# Patient Record
Sex: MALE | Race: ASIAN | Employment: FULL TIME | URBAN - METROPOLITAN AREA
[De-identification: names, ages, dates, MRNs, and addresses within clinical notes are randomized per-mention and may not be internally consistent; named-entity substitution may affect disease eponyms.]

---

## 2017-04-25 ENCOUNTER — HOSPITAL ENCOUNTER (EMERGENCY)
Facility: HOSPITAL | Age: 42
Discharge: HOME | End: 2017-04-25
Attending: EMERGENCY MEDICINE
Payer: COMMERCIAL

## 2017-04-25 ENCOUNTER — CHARTING TRANS (OUTPATIENT)
Dept: OTHER | Age: 42
End: 2017-04-25

## 2017-04-25 ENCOUNTER — IMAGING SERVICES (OUTPATIENT)
Dept: OTHER | Age: 42
End: 2017-04-25

## 2017-04-25 VITALS
RESPIRATION RATE: 16 BRPM | OXYGEN SATURATION: 99 % | SYSTOLIC BLOOD PRESSURE: 127 MMHG | BODY MASS INDEX: 27 KG/M2 | DIASTOLIC BLOOD PRESSURE: 89 MMHG | WEIGHT: 171.94 LBS | TEMPERATURE: 98 F | HEART RATE: 77 BPM

## 2017-04-25 DIAGNOSIS — S62.102A WRIST FRACTURE, LEFT, CLOSED, INITIAL ENCOUNTER: Primary | ICD-10-CM

## 2017-04-25 PROCEDURE — 99284 EMERGENCY DEPT VISIT MOD MDM: CPT

## 2017-04-25 PROCEDURE — 29125 APPL SHORT ARM SPLINT STATIC: CPT

## 2017-04-25 PROCEDURE — 96374 THER/PROPH/DIAG INJ IV PUSH: CPT

## 2017-04-25 PROCEDURE — 96376 TX/PRO/DX INJ SAME DRUG ADON: CPT

## 2017-04-25 RX ORDER — HYDROMORPHONE HYDROCHLORIDE 1 MG/ML
1 INJECTION, SOLUTION INTRAMUSCULAR; INTRAVENOUS; SUBCUTANEOUS EVERY 30 MIN PRN
Status: DISCONTINUED | OUTPATIENT
Start: 2017-04-25 | End: 2017-04-26

## 2017-04-25 RX ORDER — IBUPROFEN 600 MG/1
600 TABLET ORAL ONCE
Status: COMPLETED | OUTPATIENT
Start: 2017-04-25 | End: 2017-04-25

## 2017-04-25 RX ORDER — OXYCODONE HYDROCHLORIDE AND ACETAMINOPHEN 5; 325 MG/1; MG/1
1-2 TABLET ORAL EVERY 4 HOURS PRN
Qty: 20 TABLET | Refills: 0 | Status: ON HOLD | OUTPATIENT
Start: 2017-04-25 | End: 2017-04-27

## 2017-04-26 PROBLEM — S52.502A CLOSED FRACTURE OF DISTAL END OF LEFT RADIUS, UNSPECIFIED FRACTURE MORPHOLOGY, INITIAL ENCOUNTER: Status: ACTIVE | Noted: 2017-04-26

## 2017-04-26 NOTE — ED NOTES
PT arrives tearful and holding his left wrist. Repositioned on the cart with pillow for support to left forearm, ice applied.

## 2017-04-26 NOTE — ED NOTES
PT is dozing on cart, no distress. vss and B/P decreased. Pt states his pain remains the same. Dr Raisa Yoon informed.

## 2017-04-26 NOTE — H&P
HPI: 42year old male with left distal radius fracture    Past Medical History   Diagnosis Date   • Kidney stones    • Kidney stones 2000   • Visual impairment      reading glasses     History reviewed. No pertinent family history.     Social History   Tiana Emerson lesions. No erythema. Psychiatric: Appropriate mood and affect. Plan: left distal radius ORIF  We discussed continued conservative, non-surgical management for this problem which I always think is reasonable in elective circumstances.   However, given t

## 2017-04-26 NOTE — BRIEF OP NOTE
Pre-Operative Diagnosis: LEFT DISTAL RADIUS FRACTURE     Post-Operative Diagnosis: Left distal radius fracture      Procedure Performed:   Procedure(s):  LEFT DISTAL RADIUS OPEN REDUCTION INTERNAL FIXATION     Surgeon(s) and Role:     Rudi Medrano

## 2017-04-26 NOTE — ED PROVIDER NOTES
Patient Seen in: [facility] Emergency Department    History   Patient presents with:  Upper Extremity Injury (musculoskeletal)    Stated Complaint: left wrist deformity    HPI    Patient is a 50-year-old male who was playing soccer around 6:30 PM an All other systems reviewed and negative except as noted above. PSFH elements reviewed from today and agreed except as otherwise stated in HPI.     Physical Exam       ED Triage Vitals   BP 04/25/17 2018 159/114 mmHg   Pulse 04/25/17 2018 86   Resp 04 diagnosis)    Disposition:  Discharge    Follow-up:  Charli Stevens, [address] [address] [phone]    In 2 days        Medications Prescribed:  Discharge Medication List as of 4/25/2017 10:03 PM    START taking thes

## 2017-04-27 ENCOUNTER — SURGERY (OUTPATIENT)
Age: 42
End: 2017-04-27

## 2017-04-27 ENCOUNTER — ANESTHESIA EVENT (OUTPATIENT)
Dept: SURGERY | Facility: HOSPITAL | Age: 42
DRG: 512 | End: 2017-04-27
Payer: COMMERCIAL

## 2017-04-27 ENCOUNTER — HOSPITAL ENCOUNTER (INPATIENT)
Facility: HOSPITAL | Age: 42
LOS: 1 days | Discharge: HOME | DRG: 512 | End: 2017-04-28
Attending: ORTHOPAEDIC SURGERY | Admitting: ORTHOPAEDIC SURGERY
Payer: COMMERCIAL

## 2017-04-27 ENCOUNTER — APPOINTMENT (OUTPATIENT)
Dept: GENERAL RADIOLOGY | Facility: HOSPITAL | Age: 42
DRG: 512 | End: 2017-04-27
Attending: ORTHOPAEDIC SURGERY
Payer: COMMERCIAL

## 2017-04-27 ENCOUNTER — ANESTHESIA (OUTPATIENT)
Dept: SURGERY | Facility: HOSPITAL | Age: 42
DRG: 512 | End: 2017-04-27
Payer: COMMERCIAL

## 2017-04-27 PROBLEM — S52.502A DISTAL RADIUS FRACTURE, LEFT: Status: ACTIVE | Noted: 2017-04-27

## 2017-04-27 PROBLEM — R52 PAIN: Status: ACTIVE | Noted: 2017-04-27

## 2017-04-27 PROCEDURE — 0PSJ04Z REPOSITION LEFT RADIUS WITH INTERNAL FIXATION DEVICE, OPEN APPROACH: ICD-10-PCS | Performed by: ORTHOPAEDIC SURGERY

## 2017-04-27 PROCEDURE — 76001 XR FLUOROSCOPE EXAM >1 HR EXTENSIVE (CPT=76001): CPT

## 2017-04-27 DEVICE — IMPLANTS FOR ORTHOPEDIC BONE FIXATION
Type: IMPLANTABLE DEVICE | Site: WRIST | Status: FUNCTIONAL
Brand: VARIABLE ANGLE LOCKING SCREW

## 2017-04-27 DEVICE — IMPLANTS FOR ORTHOPEDIC BONE FIXATION. ALSO CALLED A BONE SCREW.
Type: IMPLANTABLE DEVICE | Site: WRIST | Status: FUNCTIONAL
Brand: VARIABLE ANGLE NON-LOCKING SCREW

## 2017-04-27 DEVICE — IMPLANTS FOR ORTHOPEDIC BONE FIXATION
Type: IMPLANTABLE DEVICE | Site: WRIST | Status: FUNCTIONAL
Brand: DISTAL RADIUS PLATE, NARROW, LEFT, 3 HOLES

## 2017-04-27 RX ORDER — SULFAMETHOXAZOLE AND TRIMETHOPRIM 800; 160 MG/1; MG/1
1 TABLET ORAL
Status: DISCONTINUED | OUTPATIENT
Start: 2017-04-28 | End: 2017-04-28

## 2017-04-27 RX ORDER — ONDANSETRON 2 MG/ML
4 INJECTION INTRAMUSCULAR; INTRAVENOUS AS NEEDED
Status: DISCONTINUED | OUTPATIENT
Start: 2017-04-27 | End: 2017-04-27 | Stop reason: HOSPADM

## 2017-04-27 RX ORDER — SODIUM PHOSPHATE, DIBASIC AND SODIUM PHOSPHATE, MONOBASIC 7; 19 G/133ML; G/133ML
1 ENEMA RECTAL ONCE AS NEEDED
Status: ACTIVE | OUTPATIENT
Start: 2017-04-27 | End: 2017-04-27

## 2017-04-27 RX ORDER — DOCUSATE SODIUM 100 MG/1
100 CAPSULE, LIQUID FILLED ORAL 2 TIMES DAILY
Status: DISCONTINUED | OUTPATIENT
Start: 2017-04-27 | End: 2017-04-28

## 2017-04-27 RX ORDER — ONDANSETRON 2 MG/ML
4 INJECTION INTRAMUSCULAR; INTRAVENOUS EVERY 6 HOURS PRN
Status: DISCONTINUED | OUTPATIENT
Start: 2017-04-27 | End: 2017-04-28

## 2017-04-27 RX ORDER — OXYCODONE HCL 10 MG/1
10 TABLET, FILM COATED, EXTENDED RELEASE ORAL EVERY 12 HOURS
Status: DISCONTINUED | OUTPATIENT
Start: 2017-04-27 | End: 2017-04-28

## 2017-04-27 RX ORDER — HYDROMORPHONE HYDROCHLORIDE 1 MG/ML
0.5 INJECTION, SOLUTION INTRAMUSCULAR; INTRAVENOUS; SUBCUTANEOUS EVERY 2 HOUR PRN
Status: DISCONTINUED | OUTPATIENT
Start: 2017-04-27 | End: 2017-04-28

## 2017-04-27 RX ORDER — BUPIVACAINE HYDROCHLORIDE 5 MG/ML
INJECTION, SOLUTION EPIDURAL; INTRACAUDAL AS NEEDED
Status: DISCONTINUED | OUTPATIENT
Start: 2017-04-27 | End: 2017-04-27 | Stop reason: HOSPADM

## 2017-04-27 RX ORDER — HYDROCODONE BITARTRATE AND ACETAMINOPHEN 5; 325 MG/1; MG/1
2 TABLET ORAL AS NEEDED
Status: DISCONTINUED | OUTPATIENT
Start: 2017-04-27 | End: 2017-04-27 | Stop reason: HOSPADM

## 2017-04-27 RX ORDER — HYDROCODONE BITARTRATE AND ACETAMINOPHEN 10; 325 MG/1; MG/1
2 TABLET ORAL EVERY 6 HOURS PRN
Status: DISCONTINUED | OUTPATIENT
Start: 2017-04-27 | End: 2017-04-27

## 2017-04-27 RX ORDER — SODIUM CHLORIDE, SODIUM LACTATE, POTASSIUM CHLORIDE, CALCIUM CHLORIDE 600; 310; 30; 20 MG/100ML; MG/100ML; MG/100ML; MG/100ML
INJECTION, SOLUTION INTRAVENOUS CONTINUOUS
Status: DISCONTINUED | OUTPATIENT
Start: 2017-04-27 | End: 2017-04-28

## 2017-04-27 RX ORDER — ZOLPIDEM TARTRATE 5 MG/1
5 TABLET ORAL NIGHTLY PRN
Status: DISCONTINUED | OUTPATIENT
Start: 2017-04-27 | End: 2017-04-28

## 2017-04-27 RX ORDER — OXYCODONE HYDROCHLORIDE 5 MG/1
5 TABLET ORAL ONCE
Status: COMPLETED | OUTPATIENT
Start: 2017-04-27 | End: 2017-04-27

## 2017-04-27 RX ORDER — NALOXONE HYDROCHLORIDE 0.4 MG/ML
80 INJECTION, SOLUTION INTRAMUSCULAR; INTRAVENOUS; SUBCUTANEOUS AS NEEDED
Status: DISCONTINUED | OUTPATIENT
Start: 2017-04-27 | End: 2017-04-27 | Stop reason: HOSPADM

## 2017-04-27 RX ORDER — HYDROCODONE BITARTRATE AND ACETAMINOPHEN 10; 325 MG/1; MG/1
1 TABLET ORAL EVERY 6 HOURS PRN
Status: DISCONTINUED | OUTPATIENT
Start: 2017-04-27 | End: 2017-04-27

## 2017-04-27 RX ORDER — IBUPROFEN 200 MG
600 TABLET ORAL ONCE
Status: COMPLETED | OUTPATIENT
Start: 2017-04-27 | End: 2017-04-27

## 2017-04-27 RX ORDER — HYDROCODONE BITARTRATE AND ACETAMINOPHEN 5; 325 MG/1; MG/1
1 TABLET ORAL AS NEEDED
Status: DISCONTINUED | OUTPATIENT
Start: 2017-04-27 | End: 2017-04-27 | Stop reason: HOSPADM

## 2017-04-27 RX ORDER — OXYCODONE HYDROCHLORIDE AND ACETAMINOPHEN 5; 325 MG/1; MG/1
1 TABLET ORAL EVERY 4 HOURS PRN
Status: DISCONTINUED | OUTPATIENT
Start: 2017-04-27 | End: 2017-04-28

## 2017-04-27 RX ORDER — MEPERIDINE HYDROCHLORIDE 25 MG/ML
25 INJECTION INTRAMUSCULAR; INTRAVENOUS; SUBCUTANEOUS ONCE
Status: COMPLETED | OUTPATIENT
Start: 2017-04-27 | End: 2017-04-27

## 2017-04-27 RX ORDER — HYDROMORPHONE HYDROCHLORIDE 1 MG/ML
0.4 INJECTION, SOLUTION INTRAMUSCULAR; INTRAVENOUS; SUBCUTANEOUS EVERY 5 MIN PRN
Status: DISCONTINUED | OUTPATIENT
Start: 2017-04-27 | End: 2017-04-27 | Stop reason: HOSPADM

## 2017-04-27 RX ORDER — IBUPROFEN 600 MG/1
600 TABLET ORAL EVERY 6 HOURS PRN
Status: DISCONTINUED | OUTPATIENT
Start: 2017-04-27 | End: 2017-04-28

## 2017-04-27 RX ORDER — CYCLOBENZAPRINE HCL 10 MG
10 TABLET ORAL 3 TIMES DAILY PRN
Status: DISCONTINUED | OUTPATIENT
Start: 2017-04-27 | End: 2017-04-28

## 2017-04-27 RX ORDER — HYDRALAZINE HYDROCHLORIDE 20 MG/ML
5 INJECTION INTRAMUSCULAR; INTRAVENOUS ONCE
Status: COMPLETED | OUTPATIENT
Start: 2017-04-27 | End: 2017-04-27

## 2017-04-27 RX ORDER — HYDROMORPHONE HYDROCHLORIDE 1 MG/ML
1 INJECTION, SOLUTION INTRAMUSCULAR; INTRAVENOUS; SUBCUTANEOUS EVERY 2 HOUR PRN
Status: DISCONTINUED | OUTPATIENT
Start: 2017-04-27 | End: 2017-04-28

## 2017-04-27 RX ORDER — POLYETHYLENE GLYCOL 3350 17 G/17G
17 POWDER, FOR SOLUTION ORAL DAILY PRN
Status: DISCONTINUED | OUTPATIENT
Start: 2017-04-27 | End: 2017-04-28

## 2017-04-27 RX ORDER — HYDRALAZINE HYDROCHLORIDE 20 MG/ML
INJECTION INTRAMUSCULAR; INTRAVENOUS
Status: COMPLETED
Start: 2017-04-27 | End: 2017-04-27

## 2017-04-27 RX ORDER — BISACODYL 10 MG
10 SUPPOSITORY, RECTAL RECTAL
Status: DISCONTINUED | OUTPATIENT
Start: 2017-04-27 | End: 2017-04-28

## 2017-04-27 RX ORDER — HYDROMORPHONE HYDROCHLORIDE 1 MG/ML
INJECTION, SOLUTION INTRAMUSCULAR; INTRAVENOUS; SUBCUTANEOUS
Status: COMPLETED
Start: 2017-04-27 | End: 2017-04-27

## 2017-04-27 RX ORDER — CEFAZOLIN SODIUM 1 G/3ML
INJECTION, POWDER, FOR SOLUTION INTRAMUSCULAR; INTRAVENOUS
Status: DISCONTINUED | OUTPATIENT
Start: 2017-04-27 | End: 2017-04-27

## 2017-04-27 NOTE — OR NURSING
Per Dr. Yasmine Antonio, Dr. Goran Castillo will admit the patient to 23 hr observation for pain control, he states that Dr. Goran Castillo will put orders in computer. Patient made aware. Dr. Yasmine Antonio made aware pain still 9/10, orders received for Demerol IV.  Patient made awar

## 2017-04-27 NOTE — OR NURSING
Dr. Femi Curtis made aware has been 30 minutes since ibuprofen administration, still with 8/10 pain to left wrist. Made Dr. Femi Curtis aware that Dr. Xiong  mentioned placing a block, Dr. Femi Curtis will call back.

## 2017-04-27 NOTE — ANESTHESIA PREPROCEDURE EVALUATION
PRE-OP EVALUATION    Patient Name: Uzma Vera    Pre-op Diagnosis: LEFT DISTAL RADIUS FRACTURE    Procedure(s):  LEFT DISTAL RADIUS OPEN REDUCTION INTERNAL FIXATION     Surgeon(s) and Role:     Julee Wallace MD - Primary    Pre-op vitals reviewed. status: Former Smoker  For 15.00 Years     Types: Cigarettes    Quit date: 04/26/2014    Smokeless tobacco: Never Used    Comment: 3 cigs/day for 15 yrs    Alcohol Use: Yes    Comment: socially        Drug Use: No     Available pre-op labs reviewed.

## 2017-04-27 NOTE — PROGRESS NOTES
ANESTHESIOLOGY    Patient complains of Left wrist pain after Left radius ORIF. The patient is initially sleeping in the room with no apparent distress. He states the pain is similar to his pre-op pain.   He rates the pain as 8/10 despite receiving pain me

## 2017-04-27 NOTE — ANESTHESIA POSTPROCEDURE EVALUATION
[facility]    Haven Poe Patient Status:  Hospital Outpatient Surgery   Age/Gender 42year old male MRN [id number]   Location [address] [facility] Attending Delilah Corona MD   Hosp Day # 0 PCP Shannen Matias MD

## 2017-04-27 NOTE — OR NURSING
Dr. Ryan Womack made aware patient continues to have pain 8/10 to left wrist. No new orders, will monitor.

## 2017-04-28 VITALS
OXYGEN SATURATION: 98 % | RESPIRATION RATE: 20 BRPM | DIASTOLIC BLOOD PRESSURE: 86 MMHG | BODY MASS INDEX: 26.53 KG/M2 | SYSTOLIC BLOOD PRESSURE: 130 MMHG | HEART RATE: 65 BPM | TEMPERATURE: 100 F | HEIGHT: 67 IN | WEIGHT: 169 LBS

## 2017-04-28 PROCEDURE — 85025 COMPLETE CBC W/AUTO DIFF WBC: CPT | Performed by: HOSPITALIST

## 2017-04-28 PROCEDURE — 80048 BASIC METABOLIC PNL TOTAL CA: CPT | Performed by: HOSPITALIST

## 2017-04-28 RX ORDER — POTASSIUM CHLORIDE 20 MEQ/1
20 TABLET, EXTENDED RELEASE ORAL ONCE
Status: COMPLETED | OUTPATIENT
Start: 2017-04-28 | End: 2017-04-28

## 2017-04-28 RX ORDER — PSEUDOEPHEDRINE HCL 30 MG
100 TABLET ORAL 2 TIMES DAILY PRN
Qty: 20 CAPSULE | Refills: 0 | Status: SHIPPED | OUTPATIENT
Start: 2017-04-28 | End: 2017-08-28

## 2017-04-28 NOTE — PLAN OF CARE
Pt slept good during the shift with adequate relief from current pain regimen . Voids in the washroom . Improved movement on the left hand . Will continue to monitor.

## 2017-04-28 NOTE — OPERATIVE REPORT
[facility]    PATIENT'S NAME: Neil Tong   ATTENDING PHYSICIAN: Carol Che M.D. OPERATING PHYSICIAN: Carol Che M.D.    PATIENT ACCOUNT#:   [de-identified]    LOCATION:  PACU [address] [place] [address] PACU 2 EDWP 10  MEDICAL RECORD #:   [id number]       DATE OF BIRTH: first 2 in locking and nonlocking fashion, next 2 in locking fashion, and then the more proximal row of the distal segment. We placed the remainder of the 2 screws in.   To get a little more volar flexion we did restore radial head, radial inclination, and

## 2017-04-28 NOTE — PLAN OF CARE
PAIN - ADULT    • Verbalizes/displays adequate comfort level or patient's stated pain goal Progressing        Patient/Family Goals    • Patient/Family Short Term Goal Progressing        SAFETY ADULT - FALL    • Free from fall injury Progressing        SKIN

## 2017-04-28 NOTE — PAYOR COMM NOTE
Attending Physician: Rachna Cavazos MD    Review Type: ADMISSION   Reviewer: Katya Zamudio       Date: April 28, 2017 - 9:08 AM  Payor: [address] [place] [address]  Authorization Number: [id number]/A  Admit date: 4/27/2017  9:05 AM   Admitted from Emergency Dept 4/27/2017 1230 Given 5 mg Intravenous Patricia Grossman RN      HYDROmorphone HCl PF (DILAUDID) 1 MG/ML injection 0.4 mg     Date Action Dose Route User    4/27/2017 1230 Given 0.4 mg Intravenous Patricia Grossman RN    4/27/2017 1220 Given 0.4 mg Homero Heaton for the following:     Neutrophil Absolute Prelim 8.66 (*)     Neutrophil Absolute 8.66 (*)     Monocyte Absolute 0.92 (*)     All other components within normal limits   CBC WITH DIFFERENTIAL WITH PLATELET   BASIC METABOLIC PANEL (8)         PLEASE FAX AL

## 2017-04-28 NOTE — CONSULTS
[facility] hospitalist initial consult note      PCP;Aung Ochoa MD  Consulted at the request of Dr. Bola Friend  Reason for consult medical co-management  HPI 41 yo male  With hx of kidney stones presented with left radius fracture after an accidental 04/28/17  0353   BP: 128/75   Pulse: 64   Temp: 97.6 °F (36.4 °C)   Resp: 18     Gen: awake, alert, no respiratory distress  HEENT; mmm, anicteric, EOMI  Neck no JVD  Lymph; no tender cervical LAD  CV: RRR, nl S1/S2  Pulm: CTA b/l  Abd; soft, not tender, +

## 2017-06-12 PROBLEM — Z47.89 ORTHOPEDIC AFTERCARE: Status: ACTIVE | Noted: 2017-06-12

## 2017-09-25 PROBLEM — S52.502A CLOSED FRACTURE OF DISTAL END OF LEFT RADIUS, UNSPECIFIED FRACTURE MORPHOLOGY, INITIAL ENCOUNTER: Status: RESOLVED | Noted: 2017-04-26 | Resolved: 2017-09-25

## 2017-09-25 PROBLEM — R52 PAIN: Status: RESOLVED | Noted: 2017-04-27 | Resolved: 2017-09-25

## 2017-09-25 PROBLEM — S52.502A DISTAL RADIUS FRACTURE, LEFT: Status: RESOLVED | Noted: 2017-04-27 | Resolved: 2017-09-25

## 2017-09-25 PROCEDURE — 87641 MR-STAPH DNA AMP PROBE: CPT | Performed by: INTERNAL MEDICINE

## 2017-09-29 PROCEDURE — 82043 UR ALBUMIN QUANTITATIVE: CPT | Performed by: INTERNAL MEDICINE

## 2017-09-29 PROCEDURE — 82746 ASSAY OF FOLIC ACID SERUM: CPT | Performed by: INTERNAL MEDICINE

## 2017-09-29 PROCEDURE — 82570 ASSAY OF URINE CREATININE: CPT | Performed by: INTERNAL MEDICINE

## 2017-09-29 PROCEDURE — 82607 VITAMIN B-12: CPT | Performed by: INTERNAL MEDICINE

## 2017-11-06 PROCEDURE — 87641 MR-STAPH DNA AMP PROBE: CPT | Performed by: INTERNAL MEDICINE

## 2019-03-06 PROBLEM — M75.81 TENDINITIS OF RIGHT ROTATOR CUFF: Status: ACTIVE | Noted: 2019-03-06

## 2019-10-07 PROBLEM — T84.498A FAILED ORTHOPEDIC IMPLANT (HCC): Status: ACTIVE | Noted: 2019-10-07

## (undated) DEVICE — SURGICAL INSTRUMENT OR ACCESSORY FOR ORTHOPEDIC BONE FIXATION: Brand: 1.8MM DRILL BIT FOR 2.4MM SCREWS - DISTAL RADIUS

## (undated) DEVICE — GLOVE SURG SENSICARE SZ 6-1/2

## (undated) DEVICE — UNDYED BRAIDED (POLYGLACTIN 910), SYNTHETIC ABSORBABLE SUTURE: Brand: COATED VICRYL

## (undated) DEVICE — GOWN SURG AERO CHROME XXL

## (undated) DEVICE — STERILE POLYISOPRENE POWDER-FREE SURGICAL GLOVES: Brand: PROTEXIS

## (undated) DEVICE — 3M™ COBAN™ NL STERILE NON-LATEX SELF-ADHERENT WRAP, 2084S, 4 IN X 5 YD (10 CM X 4,5 M), 18 ROLLS/CASE: Brand: 3M™ COBAN™

## (undated) DEVICE — KENDALL SCD EXPRESS SLEEVES, KNEE LENGTH, MEDIUM: Brand: KENDALL SCD

## (undated) DEVICE — SURGICAL INSTRUMENT OR ACCESSORY FOR ORTHOPEDIC BONE FIXATION: Brand: DRILL BIT KIT, 3.5MM

## (undated) DEVICE — UPPER EXTREMITY CDS-LF: Brand: MEDLINE INDUSTRIES, INC.

## (undated) DEVICE — CONVERTORS STOCKINETTE: Brand: CONVERTORS

## (undated) DEVICE — SUTURE ETHILON 3-0 PS-1

## (undated) DEVICE — SURGICAL INSTRUMENT OR ACCESSORY FOR ORTHOPEDIC BONE FIXATION: Brand: DISTAL RADIUS K-WIRE KIT

## (undated) DEVICE — DRAPE C-ARM UNIVERSAL

## (undated) DEVICE — SOL  .9 1000ML BTL

## (undated) DEVICE — SPLINT PRECUT ORTHOGLASS 4X15

## (undated) DEVICE — ZIMMER® STERILE DISPOSABLE TOURNIQUET CUFF WITH PLC, DUAL PORT, SINGLE BLADDER, 18 IN. (46 CM)

## (undated) NOTE — ED AVS SNAPSHOT
[facility] Emergency Department    [place]  [address] [address]    Phone:  [phone]    Fax:  [phone]   MRN: [id number]    Department:  [facility] Emergency Department   Date of Visit:  4/25/20 IF THERE IS ANY CHANGE OR WORSENING OF YOUR CONDITION, CALL YOUR PRIMARY CARE PHYSICIAN AT ONCE OR RETURN IMMEDIATELY TO THE EMERGENCY DEPARTMENT.     If you have been prescribed any medication(s), please fill your prescription right away and begin taking t

## (undated) NOTE — IP AVS SNAPSHOT
[facility] [place] [address] [place], [address] ~ [phone]                Discharge Summary   4/27/2017    Haven Poe           Admission Information        Provider Department    4/27/2017 Santa Morrow MD  3sw-A         T [    ]    [    ]         Margie Macdonald taking these medications        Instructions Authorizing Provider    Morning Afternoon Evening As Needed    HYDROcodone-acetaminophen  MG Tabs   Commonly known as:  NORCO        Take 1-2 tablets by mouth every 4 to Strict hand elevation will help to minimize pain, improve healing, and decrease the risk of infection.   · Keep hand elevated on two pillows when sitting or lying  · Maintain hand above the level of your heart as much as possible  · Keep hand at shoulder le General Post Op Instructions:  1. Do some nice big deep breathing-  ·  This can prevent pneumonia  2. Ambulate:   · Get up and walk several times a day-not strenuous  · Do Foot pumps when you are sitting  · This can prevent blood clots  3.  Drink Plenty of Contact information:    [address] [place] [address] [phone]          Follow up with your dermatologist.      Immunization History as of 4/28/2017  Never Reviewed    No immunizations on file.       Recent Hematology Lab Result can help with your Affordable Care Act coverage, as well as all types of Medicaid plans. To get signed up and covered, please call [phone] and ask to get set up for an insurance coverage that is in-network with [facility] Most common side effects: Constipation, drowsiness, dizziness, urinary retention (inability to urinate)   What to report to your healthcare team: Difficulty urinating, dizziness, no bowel movement in 2+ days, unresolved pain           GI Medications     do

## (undated) NOTE — LETTER
[facility]  [address] 60 [address] [facility], [address] [place] [address]    Consent for Operation    Date: __________________    Time: _______________    1.  I authorize the performance upon Marvin Plascencia the following operation:    Procedure(s):  LEFT DISTAL RADIUS OPEN procedure has been videotaped, the surgeon will obtain the original videotape. The hospital will not be responsible for storage or maintenance of this tape.     6. For the purpose of advancing medical education, I consent to the admittance of observers to t STATEMENTS REQUIRING INSERTION OR COMPLETION WERE FILLED IN.     Signature of Patient:   ___________________________    When the patient is a minor or mentally incompetent to give consent:  Signature of person authorized to consent for patient: ____________ drugs/illegal medications). Failure to inform my anesthesiologist about these medicines may increase my risk of anesthetic complications. · If I am allergic to anything or have had a reaction to anesthesia before.     3. I understand how the anesthesia med I have discussed the procedure and information above with the patient (or patient’s representative) and answered their questions. The patient or their representative has agreed to have anesthesia services.     _______________________________________________

## (undated) NOTE — IP AVS SNAPSHOT
[facility] [place] [address] [place], [address] ~ [phone]                Discharge Summary   4/27/2017    Karen Knight           Admission Information        Provider Department    4/27/2017 Thor Taylor MD  Carl Santos / Tyler Sosa · Keep your splint on at all times  · Do not remove your splint  · Do not get your splint wet. Keep your splint clean and dry. · Wear splint when sleeping. Bathing/Showers  · You can shower with a plastic bag over your bandage. Keep your splint dry. medication, swelling, numbness, tingling, bleeding, fever, or other concerns. There is always someone on call after office hours.       You have been given a prescription for Norco 10/325mg  Oxycontin 5mg was given to you at 1:45pm  · Next dose Norco due 5 - If you have concerns related to behavioral health issues or thoughts of harming yourself, contact [address] [place] [address] at [phone].     - If you don’t have insurance, Lynn Leo has partnered with [facility] [employer] [facility]

## (undated) NOTE — ED AVS SNAPSHOT
[facility] Emergency Department    [facility]  [address] [address]    Phone:  [phone]    Fax:  [phone]   MRN: [id number]    Department:  [facility] Emergency Department   Date of Visit:  4/25/20 Elevate, splint, ice, ibuprofen. Sling as directed. Percocet as needed for pain. Return if uncontrolled pain, loss of sensation to the fingers, new or worse symptoms.     Discharge References/Attachments     FRACTURE, WRIST, GENERAL (ENGLISH)      Disclo IF THERE IS ANY CHANGE OR WORSENING OF YOUR CONDITION, CALL YOUR PRIMARY CARE PHYSICIAN AT ONCE OR RETURN IMMEDIATELY TO THE EMERGENCY DEPARTMENT.     If you have been prescribed any medication(s), please fill your prescription right away and begin taking t Patient [address] to help you get signed up for insurance coverage. Patient [address] is a Federal Navigator program that can help with your Affordable Care Act coverage, as well as all types of Medicaid plans.   To get signed up and covere